# Patient Record
Sex: MALE | Race: WHITE | Employment: OTHER | URBAN - METROPOLITAN AREA
[De-identification: names, ages, dates, MRNs, and addresses within clinical notes are randomized per-mention and may not be internally consistent; named-entity substitution may affect disease eponyms.]

---

## 2017-03-07 ENCOUNTER — OFFICE VISIT (OUTPATIENT)
Dept: CARDIOLOGY CLINIC | Age: 72
End: 2017-03-07

## 2017-03-07 DIAGNOSIS — Z95.0 CARDIAC PACEMAKER IN SITU: Primary | ICD-10-CM

## 2017-03-09 ENCOUNTER — OFFICE VISIT (OUTPATIENT)
Dept: CARDIOLOGY CLINIC | Age: 72
End: 2017-03-09

## 2017-03-09 VITALS
HEIGHT: 72 IN | HEART RATE: 68 BPM | DIASTOLIC BLOOD PRESSURE: 52 MMHG | WEIGHT: 195 LBS | SYSTOLIC BLOOD PRESSURE: 98 MMHG | BODY MASS INDEX: 26.41 KG/M2

## 2017-03-09 DIAGNOSIS — I48.0 PAROXYSMAL ATRIAL FIBRILLATION (HCC): ICD-10-CM

## 2017-03-09 DIAGNOSIS — I49.5 SINUS NODE DYSFUNCTION (HCC): ICD-10-CM

## 2017-03-09 DIAGNOSIS — M06.9 RHEUMATOID ARTHRITIS, INVOLVING UNSPECIFIED SITE, UNSPECIFIED RHEUMATOID FACTOR PRESENCE: ICD-10-CM

## 2017-03-09 DIAGNOSIS — I47.1 ATRIAL TACHYCARDIA (HCC): ICD-10-CM

## 2017-03-09 DIAGNOSIS — Z90.49 S/P COLECTOMY: ICD-10-CM

## 2017-03-09 DIAGNOSIS — R07.9 CHEST PAIN, UNSPECIFIED TYPE: Primary | ICD-10-CM

## 2017-03-09 DIAGNOSIS — Z79.01 CHRONIC ANTICOAGULATION: ICD-10-CM

## 2017-03-09 DIAGNOSIS — Z95.0 CARDIAC PACEMAKER IN SITU: ICD-10-CM

## 2017-03-09 DIAGNOSIS — I25.10 ATHEROSCLEROSIS OF NATIVE CORONARY ARTERY OF NATIVE HEART WITHOUT ANGINA PECTORIS: ICD-10-CM

## 2017-03-09 RX ORDER — CYANOCOBALAMIN 1000 UG/ML
1000 INJECTION, SOLUTION INTRAMUSCULAR; SUBCUTANEOUS
COMMUNITY

## 2017-03-09 RX ORDER — NITROGLYCERIN 0.4 MG/1
TABLET SUBLINGUAL
COMMUNITY

## 2017-03-09 NOTE — MR AVS SNAPSHOT
Visit Information Date & Time Provider Department Dept. Phone Encounter #  
 3/9/2017 11:40 AM Mary Jo Corral MD [facility] [phone] [id number] Your Appointments 9/20/2017  9:30 AM  
PACEMAKER with [id number] [facility] ([place] SCHEDULING) Appt Note: sammy glover, annual thresh/M  see Lenvamshi Blew, $40CP  marquis 8/24/16  
 [address] [place]  
[address] [facility]  
  
    
 9/20/2017  9:40 AM  
ESTABLISHED PATIENT with Mary Jo Corral MD  
[facility] ([facility]) Appt Note: sammy glover, annual thresh/M  see Lenvamshi Blew, $40CP  marquis 8/24/16  
 [address] [place]  
[address] [address]  
  
    
  
 6/13/2017  9:00 AM  
REMOTE OFFICE VISIT with Leif Anaya [facility] ([place] SCHEDULING) Appt Note: sammy glover  
 [address] [facility] [address] [place]  
[address] [facility] [address] [address] Upcoming Health Maintenance Date Due Hepatitis C Screening 1945 DTaP/Tdap/Td series (1 - Tdap) 3/27/1967 FOBT Q 1 YEAR AGE 50-75 3/27/1996 ZOSTER VACCINE AGE 60> 3/27/2006 GLAUCOMA SCREENING Q2Y 3/27/2011 MEDICARE YEARLY EXAM 3/27/2011 Pneumococcal 65+ Low/Medium Risk (2 of 2 - PPSV23) 10/1/2014 INFLUENZA AGE 9 TO ADULT 8/1/2016 Allergies as of 3/9/2017  Review Complete On: 3/9/2017 By: Mary Jo Corral MD  
  
 Severity Noted Reaction Type Reactions Ambien [Zolpidem]  04/21/2012    Other (comments) Sleep walking Niacin  03/21/2013    Rash Statins-hmg-coa Reductase Inhibitors  04/21/2012    Other (comments) Muscle pain Current Immunizations  Reviewed on 9/15/2016 Name Date Influenza Vaccine 9/1/2013 Pneumococcal Vaccine (Unspecified Type) 10/1/2009 Not reviewed this visit You Were Diagnosed With   
  
 Codes Comments Chest pain, unspecified type    -  Primary ICD-10-CM: R07.9 ICD-9-CM: 786.50 Paroxysmal atrial fibrillation (HCC)     ICD-10-CM: I48.0 ICD-9-CM: 427.31 Chronic anticoagulation     ICD-10-CM: Z79.01 
ICD-9-CM: V58.61 Cardiac pacemaker in situ     ICD-10-CM: Z95.0 ICD-9-CM: V45.01 Sinus node dysfunction (HCC)     ICD-10-CM: I49.5 ICD-9-CM: 427.81 Rheumatoid arthritis, involving unspecified site, unspecified rheumatoid factor presence ([facility]     ICD-10-CM: M06.9 ICD-9-CM: 714.0 Atrial tachycardia (HCC)     ICD-10-CM: I47.1 ICD-9-CM: 427.89 S/P colectomy     ICD-10-CM: Z90.49 ICD-9-CM: V45.89 Vitals BP Pulse Height(growth percentile) Weight(growth percentile) BMI Smoking Status 98/52 (BP 1 Location: Left arm, BP Patient Position: Sitting) 68 6' (1.829 m) 195 lb (88.5 kg) 26.45 kg/m2 Former Smoker Vitals History BMI and BSA Data Body Mass Index Body Surface Area  
 26.45 kg/m 2 2.12 m 2 Preferred Pharmacy Pharmacy Name Phone [id number] [phone]  [phone] Your Updated Medication List  
  
   
This list is accurate as of: 3/9/17 12:11 PM.  Always use your most recent med list.  
  
  
  
  
 cholecalciferol 400 unit Tab tablet Commonly known as:  VITAMIN D3 Take 400 Units by mouth daily. FLOMAX 0.4 mg capsule Generic drug:  tamsulosin Take 0.4 mg by mouth two (2) times a day. HUMIRA SC  
by SubCUTAneous route Once every 2 weeks. Unsure of dose LORazepam 0.5 mg tablet Commonly known as:  ATIVAN Take 1 Tab by mouth nightly as needed. Max Daily Amount: 0.5 mg.  
  
 multivitamin tablet Commonly known as:  ONE A DAY Take  by mouth daily. nitroglycerin 0.4 mg SL tablet Commonly known as:  NITROSTAT by SubLINGual route every five (5) minutes as needed for Chest Pain. omeprazole 20 mg capsule Commonly known as:  PRILOSEC Take 20 mg by mouth two (2) times a day. ondansetron 4 mg disintegrating tablet Commonly known as:  ZOFRAN ODT Take 1 Tab by mouth every eight (8) hours as needed. RESTORIL 30 mg capsule Generic drug:  temazepam  
Take 30 mg by mouth nightly. rivaroxaban 20 mg Tab tablet Commonly known as:  Alayne Blaine Take 1 Tab by mouth daily (with dinner). traZODone 100 mg tablet Commonly known as:  Cornelious Haymaker Take 100 mg by mouth nightly. VITAMIN B-12 1,000 mcg/mL injection Generic drug:  cyanocobalamin  
1,000 mcg by IntraMUSCular route every month. We Performed the Following AMB POC EKG ROUTINE W/ 12 LEADS, INTER & REP [99203 CPT(R)] Patient Instructions Follow up with Dr. Bales January in September 2017. Introducing \[facility]\"" & HEALTH SERVICES! Dear Lavinia Kehr: Thank you for requesting a [employer] [facility] [employer] account. Our records indicate that you already have an active [employer] [facility] [employer] account. You can access your account anytime at [email or web address] Did you know that you can access your hospital and ER discharge instructions at any time in [facility]? You can also review all of your test results from your hospital stay or ER visit. Additional Information If you have questions, please visit the Frequently Asked Questions section of the [employer] [facility] [employer] website at [email or web address] Remember, [facility] is NOT to be used for urgent needs. For medical emergencies, dial 911. Now available from your iPhone and Android! Please provide this summary of care documentation to your next provider. Your primary care clinician is listed as Charlene Baca. If you have any questions after today's visit, please call [phone].

## 2017-03-09 NOTE — PROGRESS NOTES
Chief Complaint   Patient presents with    Coronary Artery Disease    Irregular Heart Beat     a-fib    Follow-up     3 month follow up     Complains of an episode of chest pain. Verified patient with two types of identifiers. Verified medications with the patient. Verified pharmacy with patient.

## 2017-03-09 NOTE — PROGRESS NOTES
Cardiac Electrophysiology Office Note     Subjective:      Malina Patel is a 80 y.o. man who is here for follow-up as he had chest pain at night and it was coming from the right neck and right arm and he took aspirin, sl nitro and now is pain free  His ECG showed NSR and ICRBBB  Nuclear stress test 2 years ago was normal in hospital    He had DVT before  He had CT without PE and has been on xarelto    Hx of gastric bypass 1993. He sees Dr Dalal Citizen- for GI motility issues, he recently had a EGD and colonoscopy that were reportedly normal.   He is a retired RN      The patient said that he has been diagnosed with rheumatoid arthritis. he had arnold colon, s/p [facility]lect[facility]  Throughout all of these hospitalizations he has lost a large amount of weight. He was previously 249 lbs and is now 159 lbs. Hx of anemia, low iron since bowel removal  Since then he has been off prednisone and he gained 20 lbs back    Device check 8/24/16 shows proper device functioning, 12%  and 5 brief episodes of PAT 1:1 conduction rate 170's asymptomatic. He has had recurrent PAT in Nov check    ECHO: 5/13/2010 at [facility]: for irregular heart beats: EF 55%, AR mild, MR and TR trace  STRESS nuc 7/2012: (eversible defect in the apical lateral and prox septal, fixed inferior wall  Cardiac CTA: 9/20120: LAD 40% distal to D1, distal to D2 LAD has 65% narrowing, distal LAD is small in caliber, LM, LCx and RCA are free of sig stenoses, 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  EF 59%  CATH: 9/27/10 [facility]) dLAD 50% LCX, LM and RCA were normal. He had cardiac   ECHO 5/7/2012: EF 55-60%, no MWA, trace AR  Event recorder strips with dyspnea, diaphoresis and dizziness associated with sinus rhythm and frequent monomorphic PVC (5/2012)  STRESS: Lexican cardiolite 5/2012: fixed inferior wall defect due to diaphragmatic attenuation, LVEF 69%, PVC were occasional during lexican  EP STUDY: 7/10/2012 SN dysfn, corrected sinus node recovery time method (longest 536 ms) however this is borderline- no pacer recomm at that time   Activation mapping of the PVC was difficult as he has rare PVC. ABLATION: RVOT   Pacemaker check 3/12/2013 4.5 hrs afib  STRESS: Lexiscan cardiolite 4/3/15- Normal gated rest/stress myocardial perfusion imaging study. No evidence of myocardial ischemia or infarction. Left ventricular ejection fraction is 61%. Chidi Dipesh 8/7/2015 69% AP, 3.9% , AMS 8, no AT/AF, 4 sec PAT, PAC      Problem List        Codes Class Noted    PTSD (post-traumatic stress disorder) 309.81  Unknown    Overview       Signed 3/21/2013  8:26 PM by Roman Trejo MD     from [place]          BPH (benign prostatic hyperplasia) 600.90  Unknown        Pacemaker V45.01  Unknown        Sinus node dysfunction 427.81  8/7/2012    Overview       Signed 8/7/2012  6:50 AM by Nasrin Plummer MD     Abnormal sinus node and corrected sinus node recovery time at EP study 7/11/2012. Tendency to sinus bradycardia and related symptomatic PVCs, intolerant to beta blocker at higher dose  Dual chamber pacemaker [employer] implantation 8/7/2012 and start sotalol          Encounter for long-term (current) use of high-risk medication V58.69  8/7/2012    Overview       Addendum 4/15/2013  6:48 PM by Nasrin Plummer MD     Sotalol for PVC and atrial fibrillation  Stopped 4/15/2013 and changed to amiodarone due to break through atrial fibrillation            Dizziness 780.4  8/6/2012        Orthostatic hypotension 458.0  8/6/2012        PVC (premature ventricular contraction) 427.69  Unknown    Overview       Signed 7/11/2012  1:02 PM by Nasrin Plummer MD     7/10/2012 EP study showed sinus node dysfunction by corrected sinus node recovery time method (longest 536 ms) however this is borderline and I did not recommend pacemaker right away. Activation mapping of the PVC was difficult as he has rare PVC.     Pace mapping was performed and localized to RVOT anterior free wall.  Ablations in this area were performed. There was no further PVCs during waiting time but long term monitoring is required for success rate. Paroxysmal atrial fibrillation 427.31  Unknown    Overview       Signed 1/17/2013  9:35 PM by Olamide Mc MD     1/16/2013, in ER with RVR          Palpitation 785.1  5/7/2012        Coronary atherosclerosis of native coronary artery (Chronic) 414.01  5/7/2012    Overview       Addendum 3/21/2013  8:24 AM by ALICE Briones     ECHO: 5/13/2010 at [facility]: for irregular heart beats: EF 55%, AR mild, MR and TR trace  STRESS nuc 7/2012: (eversible defect in the apical lateral and prox septal, fixed inferior wall  Cardiac CTA: 9/20120: LAD 40% distal to D1, distal to D2 LAD has 65% narrowing, distal LAD is small in caliber, LM, LCx and RCA are free of sig stenoses, 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 EF 59%  CATH: 9/27/10 [facility])  dLAD 50%   LCX, LM and RCA were normal.  He had cardiac CTA 9/1/2010 before the cath:                 Current Outpatient Prescriptions   Medication Sig    cyanocobalamin (VITAMIN B-12) 1,000 mcg/mL injection 1,000 mcg by IntraMUSCular route every month.  nitroglycerin (NITROSTAT) 0.4 mg SL tablet by SubLINGual route every five (5) minutes as needed for Chest Pain.  omeprazole (PRILOSEC) 20 mg capsule Take 20 mg by mouth two (2) times a day.  rivaroxaban (XARELTO) 20 mg tab tablet Take 1 Tab by mouth daily (with dinner).  tamsulosin (FLOMAX) 0.4 mg capsule Take 0.4 mg by mouth two (2) times a day.  ADALIMUMAB (HUMIRA SC) by SubCUTAneous route Once every 2 weeks. Unsure of dose    ondansetron (ZOFRAN ODT) 4 mg disintegrating tablet Take 1 Tab by mouth every eight (8) hours as needed.  LORazepam (ATIVAN) 0.5 mg tablet Take 1 Tab by mouth nightly as needed. Max Daily Amount: 0.5 mg.    cholecalciferol (VITAMIN D3) 400 unit tab tablet Take 400 Units by mouth daily.     traZODone (DESYREL) 100 mg tablet Take 100 mg by mouth nightly.  multivitamin (ONE A DAY) tablet Take  by mouth daily.  temazepam (RESTORIL) 30 mg capsule Take 30 mg by mouth nightly. No current facility-administered medications for this visit. Allergies   Allergen Reactions    Ambien (Zolpidem) Other (comments)     Sleep walking    Niacin Rash    Statins-Hmg-Coa Reductase Inhibitors Other (comments)     Muscle pain     Past Medical History   Diagnosis Date    PVC (premature ventricular contraction)     PTSD (post-traumatic stress disorder)      from [place]    Arthritis     CAD (coronary artery disease)     BPH (benign prostatic hyperplasia)     Vertigo     Atrial fibrillation      hypokalemia    Deafness in left ear      traumatic injury    Pacemaker     Tinnitus     Dumping syndrome      Past Surgical History   Procedure Date    Hx heart catheterization     Hx gastric bypass     Hx hernia repair 2005    Ep study w/induction 2012          Pr intracard electrophys 3-dimens mapping 2012          Ablation of vt 2012     RVOT    Isoproterenol non-comp con 2012          Ins ppm/icd led dual only 2012          Hx turp     Hx other surgical      left stapedectomy     Family History   Problem Relation Age of Onset   Father with COPD,  at age 80 with pneumonia  History   Substance Use Topics    Smoking status: Former Smoker     Quit date: 1980    Smokeless tobacco: Not on file    Alcohol Use: No      occasionally        Review of Systems:   Constitutional: Negative for fever, chills + weight gain  Respiratory: + wheezing. +ANGULO/SOB   Cardiovascular: no recurrent palpitations   Gastrointestinal: negative for blood in stool and melena. Genitourinary: Negative for dysuria, and hematuria. Musculoskeletal: Negative for myalgias, + arthralgia. No edema  Skin: Negative for rash. Heme: Does bleed or bruise easily.    Neurological: Negative for focal weakness     Objective: Visit Vitals    BP 98/52 (BP 1 Location: Left arm, BP Patient Position: Sitting)    Pulse 68    Ht 6' (1.829 m)    Wt 195 lb (88.5 kg)    BMI 26.45 kg/m2       Physical Exam:   Constitutional: thin  Head: Normocephalic and atraumatic. Cardiovascular: Normal rate, regular rhythm and normal heart sounds. Exam reveals no gallop and no friction rub. No murmur heard. Pulmonary/Chest: clear  Abdominal: Soft, no tenderness, scars appeared healed  Musculoskeletal: no edema  Neurological: alert,oriented. Skin: Skin is warm and dry, left sided pacemaker is unremarkable  Psychiatric: normal mood and affect. Behavior is normal. Judgment and thought content normal.      ECG NSR ICRBBB no ST depression or elevation    Assessment/Plan:       ICD-10-CM ICD-9-CM    1. Chest pain, unspecified type R07.9 786.50 AMB POC EKG ROUTINE W/ 12 LEADS, INTER & REP   2. Paroxysmal atrial fibrillation (HCC) I48.0 427.31 AMB POC EKG ROUTINE W/ 12 LEADS, INTER & REP   3. Chronic anticoagulation Z79.01 V58.61    4. Cardiac pacemaker in situ Z95.0 V45.01    5. Sinus node dysfunction (HCC) I49.5 427.81    6. Rheumatoid arthritis, involving unspecified site, unspecified rheumatoid factor presence (HCC) M06.9 714.0    7. Atrial tachycardia (HCC) I47.1 427.89    8. S/P colectomy Z90.49 V45.89    9. Atherosclerosis of native coronary artery of native heart without angina pectoris I25.10 414.01    Device check showed proper functioning, brief episodes of PAT, he felt irregular beats some times  On xarelto without reported bleeding. No recent PAF or DVT/PE  Bp on low side, no reported lightheadedness or dizziness. I discussed with the patient the management plan.   He is happy with where his rheumatoid arthritis treatment is  He will plan to move to [place] to be with his son and family  I think the chest pain is atypical and right sided, he is pain free and ECG showed no ischemia or injury so we agreed to no repeat stress test    Follow-up Disposition:  Return in about 6 months (around 9/9/2017). Kamari Davey M.D.   Electrophysiology/Cardiology  [facility]  [facility], [address] , [address], [address] [place] [address]  [phone]

## 2017-06-13 ENCOUNTER — OFFICE VISIT (OUTPATIENT)
Dept: CARDIOLOGY CLINIC | Age: 72
End: 2017-06-13

## 2017-06-13 DIAGNOSIS — Z95.0 CARDIAC PACEMAKER IN SITU: Primary | ICD-10-CM

## 2018-07-27 ENCOUNTER — OFFICE VISIT (OUTPATIENT)
Dept: CARDIOLOGY CLINIC | Age: 73
End: 2018-07-27

## 2018-07-27 DIAGNOSIS — Z95.0 CARDIAC PACEMAKER IN SITU: Primary | ICD-10-CM
